# Patient Record
Sex: FEMALE | Race: WHITE | Employment: STUDENT | URBAN - METROPOLITAN AREA
[De-identification: names, ages, dates, MRNs, and addresses within clinical notes are randomized per-mention and may not be internally consistent; named-entity substitution may affect disease eponyms.]

---

## 2017-04-11 ENCOUNTER — HOSPITAL ENCOUNTER (OUTPATIENT)
Dept: INTERVENTIONAL RADIOLOGY/VASCULAR | Age: 27
Discharge: HOME | End: 2017-04-11
Attending: ORTHOPAEDIC SURGERY | Admitting: ORTHOPAEDIC SURGERY
Payer: OTHER GOVERNMENT

## 2017-04-11 VITALS
SYSTOLIC BLOOD PRESSURE: 149 MMHG | HEART RATE: 90 BPM | BODY MASS INDEX: 22.5 KG/M2 | DIASTOLIC BLOOD PRESSURE: 77 MMHG | TEMPERATURE: 98.1 F | HEIGHT: 66 IN | RESPIRATION RATE: 20 BRPM | WEIGHT: 140 LBS | OXYGEN SATURATION: 99 %

## 2017-04-11 DIAGNOSIS — M51.36 DDD (DEGENERATIVE DISC DISEASE), LUMBAR: ICD-10-CM

## 2017-04-11 DIAGNOSIS — M54.42 LOW BACK PAIN WITH RADIATION, LEFT: ICD-10-CM

## 2017-04-11 PROCEDURE — 74011250636 HC RX REV CODE- 250/636: Performed by: RADIOLOGY

## 2017-04-11 PROCEDURE — 74011000250 HC RX REV CODE- 250: Performed by: RADIOLOGY

## 2017-04-11 PROCEDURE — 77030003666 HC NDL SPINAL BD -A

## 2017-04-11 PROCEDURE — 74011636320 HC RX REV CODE- 636/320: Performed by: RADIOLOGY

## 2017-04-11 PROCEDURE — 64483 NJX AA&/STRD TFRM EPI L/S 1: CPT

## 2017-04-11 RX ORDER — LIDOCAINE HYDROCHLORIDE 10 MG/ML
15 INJECTION, SOLUTION EPIDURAL; INFILTRATION; INTRACAUDAL; PERINEURAL
Status: COMPLETED | OUTPATIENT
Start: 2017-04-11 | End: 2017-04-11

## 2017-04-11 RX ORDER — DROSPIRENONE AND ETHINYL ESTRADIOL 0.03MG-3MG
KIT ORAL DAILY
COMMUNITY

## 2017-04-11 RX ORDER — TRAMADOL HYDROCHLORIDE 50 MG/1
50 TABLET ORAL
Status: ON HOLD | COMMUNITY
End: 2017-12-19

## 2017-04-11 RX ORDER — METHYLPREDNISOLONE ACETATE 40 MG/ML
120 INJECTION, SUSPENSION INTRA-ARTICULAR; INTRALESIONAL; INTRAMUSCULAR; SOFT TISSUE
Status: COMPLETED | OUTPATIENT
Start: 2017-04-11 | End: 2017-04-11

## 2017-04-11 RX ADMIN — IOHEXOL 20 ML: 180 INJECTION INTRAVENOUS at 10:43

## 2017-04-11 RX ADMIN — LIDOCAINE HYDROCHLORIDE 15 ML: 10 INJECTION, SOLUTION EPIDURAL; INFILTRATION; INTRACAUDAL; PERINEURAL at 10:43

## 2017-04-11 RX ADMIN — METHYLPREDNISOLONE ACETATE 120 MG: 40 INJECTION, SUSPENSION INTRA-ARTICULAR; INTRALESIONAL; INTRAMUSCULAR; SOFT TISSUE at 10:43

## 2017-04-11 RX ADMIN — SODIUM BICARBONATE 5 ML: 0.2 INJECTION, SOLUTION INTRAVENOUS at 10:43

## 2017-04-11 NOTE — PROCEDURES
PROCEDURE:TFESI. INDICATION:LLE pain. ANESTHESIA:local.  COMPLICATION:NONE. SPECIMENS REMOVED:none. BLOOD LOSS:NONE. /ASSISTANT:YUMI Hernandez RECOMMENDATIONS:none. CONSENT OBTAINED:YES.  NOTES:none.

## 2017-04-11 NOTE — IP AVS SNAPSHOT
Summary of Care Report The Summary of Care report has been created to help improve care coordination. Users with access to [employer] or [address] (Web-based application) may access additional patient information including the Discharge Summary. If you are not currently a [address] user and need more information, please call the number listed below in the [place] [address] section and ask to be connected with Medical Records. Facility Information Name Address Phone [address] [facility] [address] [id number] [phone] Patient Information Patient Name Sex  Christiane Galvan ([id number]) Female 1990 Discharge Information Admitting Provider Service Area Unit Indy Danielson MD / [facility] [address]. Shanita Aguilar / [phone] Discharge Provider Discharge Date/Time Discharge Disposition Destination (none) (none) (none) (none) You are allergic to the following Allergen Reactions Morphine Other (comments) Low heart rate Tetanus Vaccines And Toxoid Contact Dermatitis Can take mini doses Current Discharge Medication List  
  
ASK your doctor about these medications Dose & Instructions Dispensing Information Comments OCELLA 3-0.03 mg Tab Generic drug:  drospirenone-ethinyl estradiol Take  by mouth daily. Refills:  0  
   
 ULTRAM 50 mg tablet Generic drug:  traMADol Dose:  50 mg Take 50 mg by mouth every six (6) hours as needed for Pain. Refills:  0  
   
 VYVANSE 50 mg Cap Generic drug:  Lisdexamfetamine Dose:  50 mg Take 50 mg by mouth daily. Refills:  0 Follow-up Information None Discharge Instructions Steroid Injection Discharge Instructions General Information:  A steroid injection was performed today, placing a combination of a steroid and an anesthetic (numbing medicine) into the space around the nerves of your spine. This is done to treat back pain. It may take 7-10 days for the injection to reach its full potential.  This procedure can be done at any level of the spinal column, depending on where your pain is. Your doctor will have ordered the appropriate level to be treated prior to your coming in for the procedure. Home Care Instructions: You can resume your regular diet. Do not drink alcohol. You may notice that you have to use your pain medications less after your injection. Some people do not notice much of a change in their pain after the                     first injection. If that is the case, it is worth your time to have a second one done. This is why these injections are sometimes ordered in a series of                    three. Keep the puncture site clean and dry for 24 hours, and then you may remove the dressing. Showering is acceptable after the bandage is removed. Avoid tub baths, swimming, and hot tubs until puncture site is healed. Call If: 
 You should call your Physician and/or the Radiology Nurse if you have any bleeding other than a small spot on your bandage. Call if you have any signs of infection, fever, increased pain at the puncture site, confusion, or a headache that worsens when you stand and eases when lying flat. Follow-Up Instructions:  Please see your ordering doctor as he/she has requested. Let your doctor know if you have relief from your pain so they may schedule another injection for you if it is indicated. To Reach Us:    Should you experience any significant changes, please call [phone] between the hours of 730 am and 10 pm or [phone] after hours. After hours, ask the  to page the X-ray Technologist, and describe the problem to the technologist. 
 
 
 
 
Chart Review Routing History No Routing History on File

## 2017-04-11 NOTE — ROUTINE PROCESS
Pt. Discharged to home and transported to d/c lot via w/c. Able to stand and step without difficulty. Verbalized understanding of d/c instructions.

## 2017-04-11 NOTE — IP AVS SNAPSHOT
Current Discharge Medication List  
  
ASK your doctor about these medications Dose & Instructions Dispensing Information Comments Morning Noon Evening Bedtime OCELLA 3-0.03 mg Tab Generic drug:  drospirenone-ethinyl estradiol Your last dose was: Your next dose is: Take  by mouth daily. Refills:  0  
     
   
   
   
  
 ULTRAM 50 mg tablet Generic drug:  traMADol Your last dose was: Your next dose is:    
   
   
 Dose:  50 mg Take 50 mg by mouth every six (6) hours as needed for Pain. Refills:  0  
     
   
   
   
  
 VYVANSE 50 mg Cap Generic drug:  Lisdexamfetamine Your last dose was: Your next dose is:    
   
   
 Dose:  50 mg Take 50 mg by mouth daily. Refills:  0

## 2017-04-11 NOTE — DISCHARGE INSTRUCTIONS
Steroid Injection Discharge Instructions    General Information:   A steroid injection was performed today, placing a combination of a steroid and an anesthetic (numbing medicine) into the space around the nerves of your spine. This is done to treat back pain. It may take 7-10 days for the injection to reach its full potential.  This procedure can be done at any level of the spinal column, depending on where your pain is. Your doctor will have ordered the appropriate level to be treated prior to your coming in for the procedure. Home Care Instructions: You can resume your regular diet. Do not drink alcohol. You may notice that you have to use your pain medications less after your injection. Some people do not notice much of a change in their pain after the                     first injection. If that is the case, it is worth your time to have a second one done. This is why these injections are sometimes ordered in a series of                    three. Keep the puncture site clean and dry for 24 hours, and then you may remove the dressing. Showering is acceptable after the bandage is removed. Avoid tub baths, swimming, and hot tubs until puncture site is healed. Call If:   You should call your Physician and/or the Radiology Nurse if you have any bleeding other than a small spot on your bandage. Call if you have any signs of infection, fever, increased pain at the puncture site, confusion, or a headache that worsens when you stand and eases when lying flat. Follow-Up Instructions:  Please see your ordering doctor as he/she has requested. Let your doctor know if you have relief from your pain so they may schedule another injection for you if it is indicated. To Reach Us:    Should you experience any significant changes, please call [phone] between the hours of 730 am and 10 pm or [phone] after hours.   After hours, ask the  to page the [address] Technologist, and describe the problem to the technologist.

## 2017-04-11 NOTE — IP AVS SNAPSHOT
[address] 
[phone] Patient: Homero Marinelli MRN: [id number] :1990 You are allergic to the following Allergen Reactions Morphine Other (comments) Low heart rate Tetanus Vaccines And Toxoid Contact Dermatitis Can take mini doses Recent Documentation Height Weight BMI  
  
  
 1.676 m 63.5 kg 22.6 kg/m2 Emergency Contacts Name Discharge Info Relation Home Work Mobile Guzman Bonner DISCHARGE CAREGIVER [3] Spouse [3] [phone] About your hospitalization You were admitted on:  2017 You last received care in the:  [facility] RAD ANGIO IR You were discharged on:  2017 Unit phone number:  [phone] Why you were hospitalized Your primary diagnosis was:  Not on File Providers Seen During Your Hospitalizations Provider Role Specialty Primary office phone Ignacia Mejia MD Attending Provider Orthopedic Surgery [phone] Your Primary Care Physician (PCP) Primary Care Physician Office Phone Office Fax OTHER, PHYS ** None ** ** None ** Follow-up Information None Current Discharge Medication List  
  
ASK your doctor about these medications Dose & Instructions Dispensing Information Comments Morning Noon Evening Bedtime OCELLA 3-0.03 mg Tab Generic drug:  drospirenone-ethinyl estradiol Your last dose was: Your next dose is: Take  by mouth daily. Refills:  0  
     
   
   
   
  
 ULTRAM 50 mg tablet Generic drug:  traMADol Your last dose was: Your next dose is:    
   
   
 Dose:  50 mg Take 50 mg by mouth every six (6) hours as needed for Pain. Refills:  0  
     
   
   
   
  
 VYVANSE 50 mg Cap Generic drug:  Lisdexamfetamine Your last dose was: Your next dose is: Dose:  50 mg Take 50 mg by mouth daily. Refills:  0 Discharge Instructions Steroid Injection Discharge Instructions General Information: A steroid injection was performed today, placing a combination of a steroid and an anesthetic (numbing medicine) into the space around the nerves of your spine. This is done to treat back pain. It may take 7-10 days for the injection to reach its full potential.  This procedure can be done at any level of the spinal column, depending on where your pain is. Your doctor will have ordered the appropriate level to be treated prior to your coming in for the procedure. Home Care Instructions: You can resume your regular diet. Do not drink alcohol. You may notice that you have to use your pain medications less after your injection. Some people do not notice much of a change in their pain after the                     first injection. If that is the case, it is worth your time to have a second one done. This is why these injections are sometimes ordered in a series of                    three. Keep the puncture site clean and dry for 24 hours, and then you may remove the dressing. Showering is acceptable after the bandage is removed. Avoid tub baths, swimming, and hot tubs until puncture site is healed. Call If: 
 You should call your Physician and/or the Radiology Nurse if you have any bleeding other than a small spot on your bandage. Call if you have any signs of infection, fever, increased pain at the puncture site, confusion, or a headache that worsens when you stand and eases when lying flat. Follow-Up Instructions:  Please see your ordering doctor as he/she has requested. Let your doctor know if you have relief from your pain so they may schedule another injection for you if it is indicated. To Reach Us:    Should you experience any significant changes, please call [phone] between the hours of 730 am and 10 pm or [phone] after hours. After hours, ask the  to page the [address] Technologist, and describe the problem to the technologist. 
 
 
 
 
Discharge Orders None Introducing [facility] SERVICES! Opal Juarez introduces [employer] patient portal. Now you can access parts of your medical record, email your doctor's office, and request medication refills online. 1. In your internet browser, go to [email or web address] 2. Click on the First Time User? Click Here link in the Sign In box. You will see the New Member Sign Up page. 3. Enter your [employer] Access Code exactly as it appears below. You will not need to use this code after youve completed the sign-up process. If you do not sign up before the expiration date, you must request a new code. · [employer] Access Code: [email or web address] Expires: 6/4/2017  4:57 PM 
 
4. Enter the last four digits of your Social Security Number (xxxx) and Date of Birth (mm/dd/yyyy) as indicated and click Submit. You will be taken to the next sign-up page. 5. Create a [employer] ID. This will be your [employer] login ID and cannot be changed, so think of one that is secure and easy to remember. 6. Create a [employer] password. You can change your password at any time. 7. Enter your Password Reset Question and Answer. This can be used at a later time if you forget your password. 8. Enter your e-mail address. You will receive e-mail notification when new information is available in [address] 9. Click Sign Up. You can now view and download portions of your medical record. 10. Click the Download Summary menu link to download a portable copy of your medical information. If you have questions, please visit the Frequently Asked Questions section of the [employer] website.  Remember, [employer] is NOT to be used for urgent needs. For medical emergencies, dial 911. Now available from your iPhone and Android! General Information Please provide this summary of care documentation to your next provider. Patient Signature:  ____________________________________________________________ Date:  ____________________________________________________________  
  
Angelic Beckham Provider Signature:  ____________________________________________________________ Date:  ____________________________________________________________

## 2017-04-11 NOTE — ROUTINE PROCESS
Steroid Injection Discharge Instructions    General Information:   A steroid injection was performed today, placing a combination of a steroid and an anesthetic (numbing medicine) into the space around the nerves of your spine. This is done to treat back pain. It may take 7-10 days for the injection to reach its full potential.  This procedure can be done at any level of the spinal column, depending on where your pain is. Your doctor will have ordered the appropriate level to be treated prior to your coming in for the procedure. Home Care Instructions: You can resume your regular diet. Do not drink alcohol today. You may notice that you have to use your pain medications less after your injection. Some people do not notice much of a change in their pain after the first injection. If that is the case, it is worth your time to have a second one done. This is why these injections are sometimes ordered in a series of three. Keep the puncture site clean and dry for 24 hours, and then you may remove the dressing. Showering is acceptable after the bandage is removed. Rest today be aware there maybe numbness or tingling that may last up to 12 hours after the inject. Call If:   You should call your Physician and/or the Radiology Nurse if you have any bleeding other than a small spot on your bandage. Call if you have any signs of infection, fever, increased pain at the puncture site, confusion, or a headache that worsens when you stand and eases when lying flat. Follow-Up Instructions:  Please see your ordering doctor as he/she has requested. Let your doctor know if you have relief from your pain so they may schedule another injection for you if it is indicated. To Reach Us:  Should you experience any significant changes, please call [phone] between the hours of 7:30 am and 10 pm or [phone] after hours.  After hours, ask the  to page the X-ray Technologist, and describe the problem to the technologist.           Date: 4/11/2017  Discharging Nurse:  Miguel Ángel Lin RN

## 2017-12-19 ENCOUNTER — HOSPITAL ENCOUNTER (OUTPATIENT)
Dept: INTERVENTIONAL RADIOLOGY/VASCULAR | Age: 27
Discharge: HOME | End: 2017-12-19
Attending: ORTHOPAEDIC SURGERY | Admitting: ORTHOPAEDIC SURGERY
Payer: OTHER GOVERNMENT

## 2017-12-19 VITALS
WEIGHT: 125 LBS | BODY MASS INDEX: 20.09 KG/M2 | SYSTOLIC BLOOD PRESSURE: 108 MMHG | RESPIRATION RATE: 18 BRPM | DIASTOLIC BLOOD PRESSURE: 57 MMHG | OXYGEN SATURATION: 100 % | HEART RATE: 76 BPM | TEMPERATURE: 97.8 F | HEIGHT: 66 IN

## 2017-12-19 DIAGNOSIS — M43.06 LUMBAR SPONDYLOLYSIS: ICD-10-CM

## 2017-12-19 DIAGNOSIS — M48.061 LUMBAR STENOSIS: ICD-10-CM

## 2017-12-19 DIAGNOSIS — M51.36 DDD (DEGENERATIVE DISC DISEASE), LUMBAR: ICD-10-CM

## 2017-12-19 PROCEDURE — 74011636320 HC RX REV CODE- 636/320: Performed by: RADIOLOGY

## 2017-12-19 PROCEDURE — 74011250636 HC RX REV CODE- 250/636: Performed by: RADIOLOGY

## 2017-12-19 PROCEDURE — 74011000250 HC RX REV CODE- 250: Performed by: RADIOLOGY

## 2017-12-19 PROCEDURE — 64483 NJX AA&/STRD TFRM EPI L/S 1: CPT

## 2017-12-19 RX ORDER — DEXTROAMPHETAMINE SACCHARATE, AMPHETAMINE ASPARTATE, DEXTROAMPHETAMINE SULFATE AND AMPHETAMINE SULFATE 5; 5; 5; 5 MG/1; MG/1; MG/1; MG/1
20 TABLET ORAL
COMMUNITY

## 2017-12-19 RX ORDER — METHYLPREDNISOLONE ACETATE 40 MG/ML
120 INJECTION, SUSPENSION INTRA-ARTICULAR; INTRALESIONAL; INTRAMUSCULAR; SOFT TISSUE
Status: COMPLETED | OUTPATIENT
Start: 2017-12-19 | End: 2017-12-19

## 2017-12-19 RX ORDER — DEXTROAMPHETAMINE SACCHARATE, AMPHETAMINE ASPARTATE MONOHYDRATE, DEXTROAMPHETAMINE SULFATE AND AMPHETAMINE SULFATE 7.5; 7.5; 7.5; 7.5 MG/1; MG/1; MG/1; MG/1
30 CAPSULE, EXTENDED RELEASE ORAL
COMMUNITY

## 2017-12-19 RX ORDER — SODIUM BICARBONATE 42 MG/ML
5 INJECTION, SOLUTION INTRAVENOUS
Status: COMPLETED | OUTPATIENT
Start: 2017-12-19 | End: 2017-12-19

## 2017-12-19 RX ORDER — LIDOCAINE HYDROCHLORIDE 10 MG/ML
15 INJECTION, SOLUTION EPIDURAL; INFILTRATION; INTRACAUDAL; PERINEURAL
Status: COMPLETED | OUTPATIENT
Start: 2017-12-19 | End: 2017-12-19

## 2017-12-19 RX ADMIN — SODIUM BICARBONATE 5 ML: 42 INJECTION, SOLUTION INTRAVENOUS at 10:25

## 2017-12-19 RX ADMIN — LIDOCAINE HYDROCHLORIDE 15 ML: 10 INJECTION, SOLUTION EPIDURAL; INFILTRATION; INTRACAUDAL; PERINEURAL at 10:25

## 2017-12-19 RX ADMIN — METHYLPREDNISOLONE ACETATE 120 MG: 40 INJECTION, SUSPENSION INTRA-ARTICULAR; INTRALESIONAL; INTRAMUSCULAR; SOFT TISSUE at 10:25

## 2017-12-19 RX ADMIN — IOHEXOL 5 ML: 180 INJECTION INTRAVENOUS at 10:25

## 2017-12-19 NOTE — PROCEDURES
PROCEDURE:TFESI. INDICATION:LBP. ANESTHESIA:local.  COMPLICATION:NONE. SPECIMENS REMOVED:none. BLOOD LOSS:NONE. /ASSISTANT:YUMI Hernandez RECOMMENDATIONS:none. CONSENT OBTAINED:YES.  NOTES:none.

## 2017-12-19 NOTE — DISCHARGE INSTRUCTIONS
Steroid Injection Discharge Instructions    General Information:   A steroid injection was performed today, placing a combination of a steroid and an anesthetic (numbing medicine) into the space around the nerves of your spine. This is done to treat back pain. It may take 7-10 days for the injection to reach its full potential.  This procedure can be done at any level of the spinal column, depending on where your pain is. Your doctor will have ordered the appropriate level to be treated prior to your coming in for the procedure. Home Care Instructions: You can resume your regular diet. Do not drink alcohol today. You may notice that you have to use your pain medications less after your injection. Some people do not notice much of a change in their pain after the first injection. If that is the case, it is worth your time to have a second one done. This is why these injections are sometimes ordered in a series of three. Keep the puncture site clean and dry for 24 hours, and then you may remove the dressing. Showering is acceptable after the bandage is removed. Rest today be aware there maybe numbness or tingling that may last up to 12 hours after the inject. Call If:   You should call your Physician and/or the Radiology Nurse if you have any bleeding other than a small spot on your bandage. Call if you have any signs of infection, fever, increased pain at the puncture site, confusion, or a headache that worsens when you stand and eases when lying flat. Follow-Up Instructions:  Please see your ordering doctor as he/she has requested. Let your doctor know if you have relief from your pain so they may schedule another injection for you if it is indicated. To Reach Us: Side effects of sedation medications and other medications used today have been reviewed. Notify us of nausea, itching, hives, dizziness, or anything else out of the ordinary.       Should you experience any of these significant changes, please call [phone] between the hours of 7:30 am and 10 pm or [phone] after hours.  After hours, ask the  to page the [address] Technologist, and describe the problem to the technologist.